# Patient Record
(demographics unavailable — no encounter records)

---

## 2025-06-12 NOTE — CLEARANCE
[Cleared] : ~He/She~ is cleared for dental procedure.  [Cleared] : ~He/She~ is cleared for the surgical procedure [Does not require] : ~He/She~ does not require SBE prophylaxis [May participate in all age appropriate activities.] : ~He/She~ may participate in all age appropriate activities

## 2025-06-15 NOTE — DISCUSSION/SUMMARY
[May participate in all age-appropriate activities] : [unfilled] May participate in all age-appropriate activities. [FreeTextEntry1] : Blood work to include CMP, CRP, lipid profile, LPA, T3, T4, TSH, magnesium level, vitamin D level, Keppra level; follow-up 1 year or as needed pending his results [Needs SBE Prophylaxis] : [unfilled] does not need bacterial endocarditis prophylaxis

## 2025-06-15 NOTE — CONSULT LETTER
[DrOliver  ___] : Dr. DENNISON [___] : [unfilled] [Today's Date] : [unfilled] [Dear  ___:] : Dear Dr. [unfilled]: [FreeTextEntry4] : Dr Navarrete [FreeTextEntry5] : [address] [facility] [address]

## 2025-06-15 NOTE — HISTORY OF PRESENT ILLNESS
[FreeTextEntry1] : I had the pleasure of seeing Lukas Palafox at the Pediatric Cardiology Clinic at [facility] on June 12, 2025. 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 Gaytan at [facility] on March 2009 and known aortic root dilation. He was previously followed by colleagues Dr. Vazquez, Dr. Wills, and Omkar. Since his last evaluation 2 years ago, Lukas has had no symptoms referable to the cardiovascular system. Specifically, there were no reports of chest pain, palpitations, dyspnea, orthopnea, dizziness or syncope. His last seizure was 1/1/2 years ago.  He is followed at [facility] epilepsy [facility] by Igor Rodriguez and Amber.  His medications include Keppra 2.5 g/day, intuitive 4 mg daily and trazodone 50 mg daily.  Mother states that he has headaches and dizziness.  In addition, he is in need of dental work which will probably include extractions and was placed on amoxicillin 500 mg 3 times daily for the last 5 days.  He attends the 10th grade in a special education program.  He is allergic to sulfa medications.

## 2025-06-15 NOTE — HISTORY OF PRESENT ILLNESS
[FreeTextEntry1] : I had the pleasure of seeing Lukas Palafox at the Pediatric Cardiology Clinic at [facility] on June 12, 2025. 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 Gaytan at [facility] on March 2009 and known aortic root dilation. He was previously followed by colleagues Dr. Vazquez, Dr. Wills, and Omkar. Since his last evaluation 2 years ago, Lukas has had no symptoms referable to the cardiovascular system. Specifically, there were no reports of chest pain, palpitations, dyspnea, orthopnea, dizziness or syncope. His last seizure was 1/1/2 years ago.  He is followed at [facility] epilepsy center by Igor Rodriguez and Amber.  His medications include Keppra 2.5 g/day, intuitive 4 mg daily and trazodone 50 mg daily.  Mother states that he has headaches and dizziness.  In addition, he is in need of dental work which will probably include extractions and was placed on amoxicillin 500 mg 3 times daily for the last 5 days.  He attends the 10th grade in a special education program.  He is allergic to sulfa medications.

## 2025-06-15 NOTE — CONSULT LETTER
[DrOliver  ___] : Dr. DENNISON [___] : [unfilled] [Today's Date] : [unfilled] [Dear  ___:] : Dear Dr. [unfilled]: [FreeTextEntry4] : Dr Navarrete [FreeTextEntry5] : [address] [facility] [address]

## 2025-06-15 NOTE — CARDIOLOGY SUMMARY
[de-identified] : 6/12/25 [FreeTextEntry1] : sinus bradycardia, rate 46/ minute; QRS axis +50, CT 0.12, QRS 0.126, QTC 0.40; right bundle branch block [de-identified] : 6/12/25 [FreeTextEntry2] : Summary:  1. Status post patch closure of perimembranous ventricular septal defect. 2. No residual ventricular septal defect. 3. Normal left ventricular size, morphology and systolic function. 4. Normal right ventricular morphology with qualitatively normal size and systolic function. 5. Tri leaflet and Tri commissural aortic valve with the right-noncoronary and left-noncoronary      commissures being larger that the right-left commissure. No evidence of AS or AI; prior studies      noted trivial AI. 6. Mildly dilated aortic root. 7. No pericardial effusion.

## 2025-06-15 NOTE — CARDIOLOGY SUMMARY
[de-identified] : 6/12/25 [FreeTextEntry1] : sinus bradycardia, rate 46/ minute; QRS axis +50, KS 0.12, QRS 0.126, QTC 0.40; right bundle branch block [de-identified] : 6/12/25 [FreeTextEntry2] : Summary:  1. Status post patch closure of perimembranous ventricular septal defect. 2. No residual ventricular septal defect. 3. Normal left ventricular size, morphology and systolic function. 4. Normal right ventricular morphology with qualitatively normal size and systolic function. 5. Tri leaflet and Tri commissural aortic valve with the right-noncoronary and left-noncoronary      commissures being larger that the right-left commissure. No evidence of AS or AI; prior studies      noted trivial AI. 6. Mildly dilated aortic root. 7. No pericardial effusion.

## 2025-06-15 NOTE — CARDIOLOGY SUMMARY
[de-identified] : 6/12/25 [FreeTextEntry1] : sinus bradycardia, rate 46/ minute; QRS axis +50, HI 0.12, QRS 0.126, QTC 0.40; right bundle branch block [de-identified] : 6/12/25 [FreeTextEntry2] : Summary:  1. Status post patch closure of perimembranous ventricular septal defect. 2. No residual ventricular septal defect. 3. Normal left ventricular size, morphology and systolic function. 4. Normal right ventricular morphology with qualitatively normal size and systolic function. 5. Tri leaflet and Tri commissural aortic valve with the right-noncoronary and left-noncoronary      commissures being larger that the right-left commissure. No evidence of AS or AI; prior studies      noted trivial AI. 6. Mildly dilated aortic root. 7. No pericardial effusion.

## 2025-06-15 NOTE — CONSULT LETTER
[DrOliver  ___] : Dr. DENNISON [___] : [unfilled] [Today's Date] : [unfilled] [Dear  ___:] : Dear Dr. [unfilled]: [FreeTextEntry4] : Dr Navarrete [FreeTextEntry5] : [address] [facility] [address]

## 2025-06-15 NOTE — HISTORY OF PRESENT ILLNESS
[FreeTextEntry1] : I had the pleasure of seeing Lukas Palafox at the Pediatric Cardiology Clinic at [facility] on June 12, 2025. 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 Gaytan at [facility] on March 2009 and known aortic root dilation. He was previously followed by colleagues Dr. Vazquez, Dr. Wills, and Omkar. Since his last evaluation 2 years ago, Lukas has had no symptoms referable to the cardiovascular system. Specifically, there were no reports of chest pain, palpitations, dyspnea, orthopnea, dizziness or syncope. His last seizure was 1/1/2 years ago.  He is followed at [facility] epilepsy center by Igor Rodriguez and Amber.  His medications include Keppra 2.5 g/day, intuitive 4 mg daily and trazodone 50 mg daily.  Mother states that he has headaches and dizziness.  In addition, he is in need of dental work which will probably include extractions and was placed on amoxicillin 500 mg 3 times daily for the last 5 days.  He attends the 10th grade in a special education program.  He is allergic to sulfa medications.

## 2025-06-15 NOTE — REVIEW OF SYSTEMS
[Nl] : Hematologic/Lymphatic [Seizure] : seizures [Headache] : headache [Dizziness] : dizziness [Fainting (Syncope)] : no fainting

## 2025-06-15 NOTE — HISTORY OF PRESENT ILLNESS
[FreeTextEntry1] : I had the pleasure of seeing Lukas Palafox at the Pediatric Cardiology Clinic at [facility] on June 12, 2025. 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 Gaytan at [facility] on March 2009 and known aortic root dilation. He was previously followed by colleagues Dr. Vazquez, Dr. Wills, and Omkar. Since his last evaluation 2 years ago, Lukas has had no symptoms referable to the cardiovascular system. Specifically, there were no reports of chest pain, palpitations, dyspnea, orthopnea, dizziness or syncope. His last seizure was 1/1/2 years ago.  He is followed at [facility] epilepsy center by Igor Rodriguez and Amber.  His medications include Keppra 2.5 g/day, intuitive 4 mg daily and trazodone 50 mg daily.  Mother states that he has headaches and dizziness.  In addition, he is in need of dental work which will probably include extractions and was placed on amoxicillin 500 mg 3 times daily for the last 5 days.  He attends the 10th grade in a special education program.  He is allergic to sulfa medications.

## 2025-06-15 NOTE — DISCUSSION/SUMMARY
[May participate in all age-appropriate activities] : [unfilled] May participate in all age-appropriate activities. [Needs SBE Prophylaxis] : [unfilled] does not need bacterial endocarditis prophylaxis [FreeTextEntry1] : Blood work to include CMP, CRP, lipid profile, LPA, T3, T4, TSH, magnesium level, vitamin D level, Keppra level; follow-up 1 year or as needed pending his results

## 2025-06-15 NOTE — CONSULT LETTER
[DrOliver  ___] : Dr. DENNISON [___] : [unfilled] [Today's Date] : [unfilled] [Dear  ___:] : Dear Dr. [unfilled]: [FreeTextEntry4] : Dr Navarrete [FreeTextEntry5] : [address] [facility] [address]

## 2025-06-15 NOTE — CARDIOLOGY SUMMARY
[de-identified] : 6/12/25 [FreeTextEntry1] : sinus bradycardia, rate 46/ minute; QRS axis +50, [place] 0.12, QRS 0.126, QTC 0.40; right bundle branch block [de-identified] : 6/12/25 [FreeTextEntry2] : Summary:  1. Status post patch closure of perimembranous ventricular septal defect. 2. No residual ventricular septal defect. 3. Normal left ventricular size, morphology and systolic function. 4. Normal right ventricular morphology with qualitatively normal size and systolic function. 5. Tri leaflet and Tri commissural aortic valve with the right-noncoronary and left-noncoronary      commissures being larger that the right-left commissure. No evidence of AS or AI; prior studies      noted trivial AI. 6. Mildly dilated aortic root. 7. No pericardial effusion.